# Patient Record
Sex: MALE | Race: WHITE | Employment: FULL TIME | URBAN - METROPOLITAN AREA
[De-identification: names, ages, dates, MRNs, and addresses within clinical notes are randomized per-mention and may not be internally consistent; named-entity substitution may affect disease eponyms.]

---

## 2018-09-06 ENCOUNTER — HOSPITAL ENCOUNTER (INPATIENT)
Age: 48
LOS: 2 days | Discharge: HOME | DRG: 754 | End: 2018-09-09
Attending: EMERGENCY MEDICINE | Admitting: PSYCHIATRY & NEUROLOGY
Payer: MEDICAID

## 2018-09-06 DIAGNOSIS — R45.89 SUICIDAL BEHAVIOR WITHOUT ATTEMPTED SELF-INJURY: Primary | ICD-10-CM

## 2018-09-06 DIAGNOSIS — F10.920 ACUTE ALCOHOLIC INTOXICATION WITHOUT COMPLICATION (HCC): ICD-10-CM

## 2018-09-06 DIAGNOSIS — R45.851 SUICIDAL THOUGHTS: ICD-10-CM

## 2018-09-06 LAB
A/G RATIO: 1.8 (ref 1.1–2.2)
ACETAMINOPHEN LEVEL: <5 UG/ML (ref 10–30)
ALBUMIN SERPL-MCNC: 4.7 G/DL (ref 3.4–5)
ALP BLD-CCNC: 67 U/L (ref 40–129)
ALT SERPL-CCNC: 21 U/L (ref 10–40)
AMPHETAMINE SCREEN, URINE: POSITIVE
ANION GAP SERPL CALCULATED.3IONS-SCNC: 14 MMOL/L (ref 3–16)
AST SERPL-CCNC: 15 U/L (ref 15–37)
BARBITURATE SCREEN URINE: ABNORMAL
BASOPHILS ABSOLUTE: 0.1 K/UL (ref 0–0.2)
BASOPHILS RELATIVE PERCENT: 1 %
BENZODIAZEPINE SCREEN, URINE: ABNORMAL
BILIRUB SERPL-MCNC: 0.3 MG/DL (ref 0–1)
BILIRUBIN URINE: NEGATIVE
BLOOD, URINE: ABNORMAL
BUN BLDV-MCNC: 8 MG/DL (ref 7–20)
CALCIUM SERPL-MCNC: 9.2 MG/DL (ref 8.3–10.6)
CANNABINOID SCREEN URINE: ABNORMAL
CHLORIDE BLD-SCNC: 97 MMOL/L (ref 99–110)
CLARITY: CLEAR
CO2: 23 MMOL/L (ref 21–32)
COCAINE METABOLITE SCREEN URINE: ABNORMAL
COLOR: ABNORMAL
CREAT SERPL-MCNC: 0.7 MG/DL (ref 0.9–1.3)
EOSINOPHILS ABSOLUTE: 0.2 K/UL (ref 0–0.6)
EOSINOPHILS RELATIVE PERCENT: 3.3 %
ETHANOL: 186 MG/DL (ref 0–0.08)
GFR AFRICAN AMERICAN: >60
GFR NON-AFRICAN AMERICAN: >60
GLOBULIN: 2.6 G/DL
GLUCOSE BLD-MCNC: 95 MG/DL (ref 70–99)
GLUCOSE URINE: NEGATIVE MG/DL
HCT VFR BLD CALC: 43.6 % (ref 40.5–52.5)
HEMOGLOBIN: 15.4 G/DL (ref 13.5–17.5)
KETONES, URINE: NEGATIVE MG/DL
LEUKOCYTE ESTERASE, URINE: NEGATIVE
LYMPHOCYTES ABSOLUTE: 2.7 K/UL (ref 1–5.1)
LYMPHOCYTES RELATIVE PERCENT: 42.4 %
Lab: ABNORMAL
MCH RBC QN AUTO: 33.7 PG (ref 26–34)
MCHC RBC AUTO-ENTMCNC: 35.3 G/DL (ref 31–36)
MCV RBC AUTO: 95.6 FL (ref 80–100)
METHADONE SCREEN, URINE: ABNORMAL
MICROSCOPIC EXAMINATION: YES
MONOCYTES ABSOLUTE: 0.4 K/UL (ref 0–1.3)
MONOCYTES RELATIVE PERCENT: 6.9 %
NEUTROPHILS ABSOLUTE: 3 K/UL (ref 1.7–7.7)
NEUTROPHILS RELATIVE PERCENT: 46.4 %
NITRITE, URINE: NEGATIVE
OPIATE SCREEN URINE: ABNORMAL
OXYCODONE URINE: ABNORMAL
PDW BLD-RTO: 13 % (ref 12.4–15.4)
PH UA: 6
PH UA: 6
PHENCYCLIDINE SCREEN URINE: ABNORMAL
PLATELET # BLD: 264 K/UL (ref 135–450)
PMV BLD AUTO: 7.4 FL (ref 5–10.5)
POTASSIUM REFLEX MAGNESIUM: 4 MMOL/L (ref 3.5–5.1)
PROPOXYPHENE SCREEN: ABNORMAL
PROTEIN UA: NEGATIVE MG/DL
RBC # BLD: 4.56 M/UL (ref 4.2–5.9)
RBC UA: NORMAL /HPF (ref 0–2)
SALICYLATE, SERUM: <0.3 MG/DL (ref 15–30)
SODIUM BLD-SCNC: 134 MMOL/L (ref 136–145)
SPECIFIC GRAVITY UA: <=1.005
TOTAL PROTEIN: 7.3 G/DL (ref 6.4–8.2)
URINE TYPE: ABNORMAL
UROBILINOGEN, URINE: 0.2 E.U./DL
WBC # BLD: 6.4 K/UL (ref 4–11)
WBC UA: NORMAL /HPF (ref 0–5)

## 2018-09-06 PROCEDURE — 81001 URINALYSIS AUTO W/SCOPE: CPT

## 2018-09-06 PROCEDURE — 85025 COMPLETE CBC W/AUTO DIFF WBC: CPT

## 2018-09-06 PROCEDURE — G0480 DRUG TEST DEF 1-7 CLASSES: HCPCS

## 2018-09-06 PROCEDURE — 99285 EMERGENCY DEPT VISIT HI MDM: CPT

## 2018-09-06 PROCEDURE — 80053 COMPREHEN METABOLIC PANEL: CPT

## 2018-09-06 PROCEDURE — 80307 DRUG TEST PRSMV CHEM ANLYZR: CPT

## 2018-09-06 RX ORDER — NICOTINE 21 MG/24HR
1 PATCH, TRANSDERMAL 24 HOURS TRANSDERMAL DAILY
Status: DISCONTINUED | OUTPATIENT
Start: 2018-09-06 | End: 2018-09-09 | Stop reason: HOSPADM

## 2018-09-07 PROBLEM — F39 MOOD DISORDER (HCC): Status: ACTIVE | Noted: 2018-09-07

## 2018-09-07 PROBLEM — F10.920 ALCOHOLIC INTOXICATION WITHOUT COMPLICATION (HCC): Status: ACTIVE | Noted: 2018-09-07

## 2018-09-07 PROBLEM — F32.A DEPRESSIVE DISORDER: Status: ACTIVE | Noted: 2018-09-07

## 2018-09-07 PROBLEM — R82.5 POSITIVE URINE DRUG SCREEN: Status: ACTIVE | Noted: 2018-09-07

## 2018-09-07 PROBLEM — F10.10 ALCOHOL ABUSE: Chronic | Status: ACTIVE | Noted: 2018-09-07

## 2018-09-07 LAB — ETHANOL: 63 MG/DL (ref 0–0.08)

## 2018-09-07 PROCEDURE — 99221 1ST HOSP IP/OBS SF/LOW 40: CPT | Performed by: PHYSICIAN ASSISTANT

## 2018-09-07 PROCEDURE — 1240000000 HC EMOTIONAL WELLNESS R&B

## 2018-09-07 PROCEDURE — G0480 DRUG TEST DEF 1-7 CLASSES: HCPCS

## 2018-09-07 PROCEDURE — 99223 1ST HOSP IP/OBS HIGH 75: CPT | Performed by: PSYCHIATRY & NEUROLOGY

## 2018-09-07 RX ORDER — OLANZAPINE 10 MG/1
10 INJECTION, POWDER, LYOPHILIZED, FOR SOLUTION INTRAMUSCULAR
Status: ACTIVE | OUTPATIENT
Start: 2018-09-07 | End: 2018-09-07

## 2018-09-07 RX ORDER — ONDANSETRON 2 MG/ML
4 INJECTION INTRAMUSCULAR; INTRAVENOUS EVERY 6 HOURS PRN
Status: DISCONTINUED | OUTPATIENT
Start: 2018-09-07 | End: 2018-09-09 | Stop reason: HOSPADM

## 2018-09-07 RX ORDER — NICOTINE 21 MG/24HR
1 PATCH, TRANSDERMAL 24 HOURS TRANSDERMAL DAILY PRN
Status: DISCONTINUED | OUTPATIENT
Start: 2018-09-07 | End: 2018-09-09 | Stop reason: HOSPADM

## 2018-09-07 RX ORDER — HYDROXYZINE PAMOATE 50 MG/1
50 CAPSULE ORAL 3 TIMES DAILY PRN
Status: DISCONTINUED | OUTPATIENT
Start: 2018-09-07 | End: 2018-09-09 | Stop reason: HOSPADM

## 2018-09-07 RX ORDER — OLANZAPINE 5 MG/1
5 TABLET ORAL 2 TIMES DAILY PRN
Status: DISCONTINUED | OUTPATIENT
Start: 2018-09-07 | End: 2018-09-09 | Stop reason: HOSPADM

## 2018-09-07 RX ORDER — ACETAMINOPHEN 325 MG/1
650 TABLET ORAL EVERY 4 HOURS PRN
Status: DISCONTINUED | OUTPATIENT
Start: 2018-09-07 | End: 2018-09-09 | Stop reason: HOSPADM

## 2018-09-07 RX ORDER — MAGNESIUM HYDROXIDE/ALUMINUM HYDROXICE/SIMETHICONE 120; 1200; 1200 MG/30ML; MG/30ML; MG/30ML
30 SUSPENSION ORAL EVERY 6 HOURS PRN
Status: DISCONTINUED | OUTPATIENT
Start: 2018-09-07 | End: 2018-09-09 | Stop reason: HOSPADM

## 2018-09-07 ASSESSMENT — SLEEP AND FATIGUE QUESTIONNAIRES
DIFFICULTY STAYING ASLEEP: NO
SLEEP PATTERN: DIFFICULTY FALLING ASLEEP
RESTFUL SLEEP: NO
DIFFICULTY ARISING: NO
DIFFICULTY FALLING ASLEEP: YES
DO YOU HAVE DIFFICULTY SLEEPING: YES

## 2018-09-07 ASSESSMENT — LIFESTYLE VARIABLES: HISTORY_ALCOHOL_USE: NO

## 2018-09-07 ASSESSMENT — PAIN SCALES - GENERAL: PAINLEVEL_OUTOF10: 10

## 2018-09-07 ASSESSMENT — PAIN DESCRIPTION - FREQUENCY: FREQUENCY: CONTINUOUS

## 2018-09-07 ASSESSMENT — PAIN DESCRIPTION - LOCATION: LOCATION: BACK;NECK

## 2018-09-07 ASSESSMENT — PAIN DESCRIPTION - PAIN TYPE: TYPE: CHRONIC PAIN

## 2018-09-07 ASSESSMENT — PAIN DESCRIPTION - PROGRESSION: CLINICAL_PROGRESSION: NOT CHANGED

## 2018-09-07 NOTE — ED NOTES
Pt continues to be awake and JIM lobby. Declines to go into assigned treatment room to rest. Pt remains social with staff and peers. Pt offered support to a peer when she became tearful.

## 2018-09-07 NOTE — ED NOTES
Pt sitting in [facility] OF [facility] chairs laughing and joking with staff. Pt pleasant, calm and cooperative at this time.       [address] [place] [address]  09/06/18 2020

## 2018-09-07 NOTE — ED PROVIDER NOTES
Emergency Physician Note    Chief Complaint  Suicidal (ran out in the woods drank a bottle of what he thought was antifreeze and ate peaches because he thought he was allergic.  pt continually threatening to kill himself.  stating if anyone tries to draw his blood he will kill himself infront of me or make us kill him.  states as soon as he gets the chance he is going to kill himself)       History of Present Illness  Adis Vines is a 53 y.o. male who presents to the ED for suicidal risk. Patient was brought in by the police after making active suicidal statements to his family members. Patient states at this time he has every intention to take kill himself, he states that he wants to just \"leave life,\" he states that he promises to be dead by tomorrow at noon. He does report drink alcohol today but denies any illicit drug use. Denies fever, chills, malaise, chest pain, shortness of breath, cough, abdominal pain, nausea, vomiting, diarrhea, headache, sore throat, dysuria, back pain, rash. No palliative/provocative factors. Positives and pertinent negatives as per HPI. All other of the 10 systems were reviewed and are negative. I have reviewed the following from the nursing documentation:      Prior to Admission medications    Not on File       Allergies as of 09/06/2018    (No Known Allergies)       Past Medical History:   Diagnosis Date    Cancer [facility])     testicular        Surgical History:   Past Surgical History:   Procedure Laterality Date    APPENDECTOMY          Family History:  History reviewed. No pertinent family history. Social History     Social History    Marital status: Single     Spouse name: N/A    Number of children: N/A    Years of education: N/A     Occupational History    Not on file.      Social History Main Topics    Smoking status: Current Every Day Smoker     Packs/day: 1.00    Smokeless tobacco: Not on file    Alcohol use Yes    Drug use: Unknown    thoughts    3. Acute alcoholic intoxication without complication (HCC)        Blood pressure 139/81, pulse 100, temperature 98.6 °F (37 °C), temperature source Oral, resp. rate 18, weight 185 lb (83.9 kg), SpO2 97 %. I have signed out [facility] Emergency Department care to my colleague, Dr. Truman Spencer. We discussed the pertinent history, physical exam, completed/pending test results (if applicable) and current treatment plan. Please refer to his/her chart for the patients remaining Emergency Department course and final disposition. The note was completed using Dragon voice recognition transcription. Every effort was made to ensure accuracy; however, inadvertent transcription errors may be present despite my best efforts to edit errors.     Damon Schneider MD  [facility]        Damon Schneider MD  09/06/18 [id number]

## 2018-09-07 NOTE — ED PROVIDER NOTES
suicidal ideation. Patient is currently awaiting reevaluation when sober for further evaluation and disposition. Patient's repeat blood alcohol is 63. This point the patient is clear for psychiatric evaluation. Patient was admitted to the hospital for further psychiatric evaluation and treatment    ED Medication Orders     Start Ordered     Status Ordering Provider    09/06/18 1930 09/06/18 1924  nicotine (NICODERM CQ) 21 MG/24HR 1 patch  DAILY      Acknowledged oMises Levine          Final Impression      1. Suicidal behavior without attempted self-injury    2. Suicidal thoughts    3.  Acute alcoholic intoxication without complication ([facility] [address]        DISPOSITION Ed Observation 09/06/2018 08:55:08 PM     (Please note that portions of this note may have been completed with a voice recognition program. Efforts were made to edit the dictations but occasionally words are mis-transcribed.)    Anamaria Haile MD  [address] [facility]         Anamaria Haile MD  09/07/18 Lester Leggett MD  09/07/18 [id number]

## 2018-09-07 NOTE — H&P
for dyspnea, cough   Cardiovascular: Negative for chest pain   Gastrointestinal: Negative for vomiting, diarrhea   Genitourinary: Negative for hematuria   Musculoskeletal: Negative for arthralgias   Skin: Negative for rash   Neurological: Negative for syncope    Hematological: Negative for easy bruising/bleeding   Psychiatric/Behavorial: Per psychiatry team evaluation     PHYSICAL EXAM:    BP (!) 151/102 Comment: RN notified  Pulse 67   Temp 98.9 °F (37.2 °C) (Oral)   Resp 16   Ht 6' (1.829 m)   Wt 185 lb (83.9 kg)   SpO2 97%   BMI 25.09 kg/m²     Gen: No distress. Alert. Eyes: PERRL. No sclera icterus. No conjunctival injection. ENT: No discharge. Pharynx clear. Neck: No JVD. No Carotid Bruit. Trachea midline. Resp: No accessory muscle use. No crackles. No wheezes. No rhonchi. CV: Regular rate. Regular rhythm. No murmur. No rub. No edema. GI: Non-tender. Non-distended. Normal bowel sounds. Skin: Warm and dry. No nodule on exposed extremities. No rash on exposed extremities. M/S: No cyanosis. No joint deformity. No clubbing. Neuro: Awake. No focal neurologic deficit on exam.  Cranial nerves II through XII intact. Patient is able to ambulate without difficulty. Psych: Per psychiatry team evaluation     CBC:   Recent Labs      09/06/18 1925   WBC  6.4   HGB  15.4   HCT  43.6   MCV  95.6   PLT  264     BMP:   Recent Labs      09/06/18 1925   NA  134*   K  4.0   CL  97*   CO2  23   BUN  8   CREATININE  0.7*     LIVER PROFILE:   Recent Labs      09/06/18 1925   AST  15   ALT  21   BILITOT  0.3   ALKPHOS  67     PT/INR: No results for input(s): PROTIME, INR in the last 72 hours. APTT: No results for input(s): APTT in the last 72 hours.   UA:  Recent Labs      09/06/18 1915   COLORU  Straw   PHUR  6.0  6.0   WBCUA  0-2   RBCUA  0-2   CLARITYU  Clear   SPECGRAV  <=1.005   LEUKOCYTESUR  Negative   UROBILINOGEN  0.2   BILIRUBINUR  Negative   BLOODU  TRACE-INTACT*   GLUCOSEU  Negative

## 2018-09-07 NOTE — PLAN OF CARE
Problem: Depressive Behavior With or Without Suicide Precautions:  Goal: Able to verbalize acceptance of life and situations over which he or she has no control  Able to verbalize acceptance of life and situations over which he or she has no control   Outcome: Ongoing                                                                      Group Therapy Note    Date: 9/7/2018  Start Time: 1:00  End Time:  2:30  Number of Participants: 7    Type of Group: Recovery   \"who am I\"    Patient's Goal:  To reduce anxiety and depressive symptoms by participating in group discussion    Notes:  Patient participated minimally in group discussion. Patient reports he is angry about being in the hospital.  Patient reports he does not want to talk and will not be participating in any after care. Patient was supportive to peers. Status After Intervention:  Unchanged    Participation Level:  Active Listener and Minimal    Participation Quality: Attentive      Speech:  normal      Thought Process/Content: Logical      Affective Functioning: Congruent      Mood: angry and anxious      Level of consciousness:  Alert      Response to Learning: Able to verbalize current knowledge/experience, Able to verbalize/acknowledge new learning, Able to retain information and Capable of insight      Endings: None Reported    Modes of Intervention: Education, Support, Socialization and Exploration      Discipline Responsible: /Counselor      Signature:  SANDRA Bautista

## 2018-09-08 PROCEDURE — 1240000000 HC EMOTIONAL WELLNESS R&B

## 2018-09-08 PROCEDURE — 99233 SBSQ HOSP IP/OBS HIGH 50: CPT | Performed by: PSYCHIATRY & NEUROLOGY

## 2018-09-08 PROCEDURE — 6370000000 HC RX 637 (ALT 250 FOR IP): Performed by: NURSE PRACTITIONER

## 2018-09-08 RX ORDER — IBUPROFEN 600 MG/1
600 TABLET ORAL EVERY 6 HOURS PRN
Status: DISCONTINUED | OUTPATIENT
Start: 2018-09-08 | End: 2018-09-09 | Stop reason: HOSPADM

## 2018-09-08 RX ADMIN — ACETAMINOPHEN 650 MG: 325 TABLET ORAL at 11:50

## 2018-09-08 ASSESSMENT — PAIN SCALES - GENERAL: PAINLEVEL_OUTOF10: 3

## 2018-09-08 NOTE — BH NOTE
Group Therapy Note    Date: 9/7/2018  Start Time: 2030  End Time:  2100  Number of Participants: 10    Type of Group: Wrap-Up       Status After Intervention:  Improved    Participation Level:  Active Listener and Interactive    Participation Quality: Appropriate, Sharing and Supportive      Speech:  normal      Thought Process/Content: Logical  Linear      Affective Functioning: Appropriate       Mood: depressed      Level of consciousness:  Alert and Oriented x4      Response to Learning: Capable of insight and Progressing to goal      Modes of Intervention: Support, Socialization, Exploration and Clarifying      Discipline Responsible: [employer]      Signature:  Cari Henderson
Patient attended 1:00 EMILIANO Group.
for counseling faxed to [facility]                                           ( ) Patient refused counseling  ( ) Patient has not smoked in the last 30 days    Metabolic Screening:    No results found for: LABA1C    No results for input(s): CHOL, TRIG, HDL, LDLCALC, LABVLDL in the last 72 hours. Body mass index is 25.09 kg/m². BP Readings from Last 2 Encounters:   09/07/18 (!) 151/102   02/08/12 (!) 138/92           Pt admitted with followings belongings:  Dentures: None  Vision - Corrective Lenses: None  Hearing Aid: None  Jewelry: None  Body Piercings Removed: N/A  Clothing: Footwear, Pants, Shirt, Socks, Undergarments (Comment)  Were All Patient Medications Collected?: Not Applicable  Other Valuables: Other (Comment) (cigarette papers)     Valuables placed in locker clothes to patient. Patient's home medications were none. Patient oriented to surroundings and program expectations and copy of patient rights given. Received admission packet:  yes. Consents reviewed, signed none. Refused all paperwork because he feels he should not be here. Patient verbalize understanding:  yes.     Patient education on precautions: yes                   Rishi Marquez RN
minimum assist (75% patients effort)
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
## 2018-09-08 NOTE — PROGRESS NOTES
Department of Psychiatry  Attending Progress Note  Chief Complaint: follow-up Michelle Torres is brighter but is spending time in bed and not attending all program.He doesn't seem to  think that his alcohol an danger are a problem but family stated that it is. Feeling tired and worried about the kids in the home. Stated that he only tasted the antifreeze and didn't drink much of it. Patient's chart was reviewed and collaborated with  about the treatment plan. SUBJECTIVE:    Patient is feeling better. Suicidal ideation:  denies suicidal ideation. Patient does not have medication side effects. ROS: Patient has new complaints: no  Sleeping adequately:  Yes   Appetite adequate: Yes  Attending groups: Yes  Visitors:No    OBJECTIVE    Physical  VITALS:  /66   Pulse 51   Temp 98 °F (36.7 °C) (Oral)   Resp 16   Ht 6' (1.829 m)   Wt 185 lb (83.9 kg)   SpO2 97%   BMI 25.09 kg/m²     Mental Status Examination:  Patients appearance was street clothes. Thoughts are Goal directed. Homicidal ideations none. No abnormal movements, tics or mannerisms. Memory intact Aims 0. Concentration Good. Alert and oriented X 4. Insight and Judgement impaired insight. Patient was cooperative. Patient gait normal. Mood within normal limits, affect normal affect Hallucinations Absent, suicidal ideations no specific plan to harm self Speech normal volume  Data  Labs:   Admission on 09/06/2018   Component Date Value Ref Range Status    Ethanol Lvl 09/06/2018 186  mg/dL Final    Comment:    None Detected  Conversion factor:  100 mg/dl = .100 g/dl  For Medical Purposes Only      Amphetamine Screen, Urine 09/06/2018 POSITIVE* Negative <1000ng/mL Final    Comment: High concentrations of ephedrine/pseudoephedrine or  phenylpropanolamine may cause false positive results  for amphetamine. Therefore, confirmatory testing for  amphetamine should be considered if clinically indicated.       Barbiturate Screen, Ur 09/06/2018 Neg K/uL Final    MPV 09/06/2018 7.4  5.0 - 10.5 fL Final    Neutrophils % 09/06/2018 46.4  % Final    Lymphocytes % 09/06/2018 42.4  % Final    Monocytes % 09/06/2018 6.9  % Final    Eosinophils % 09/06/2018 3.3  % Final    Basophils % 09/06/2018 1.0  % Final    Neutrophils # 09/06/2018 3.0  1.7 - 7.7 K/uL Final    Lymphocytes # 09/06/2018 2.7  1.0 - 5.1 K/uL Final    Monocytes # 09/06/2018 0.4  0.0 - 1.3 K/uL Final    Eosinophils # 09/06/2018 0.2  0.0 - 0.6 K/uL Final    Basophils # 09/06/2018 0.1  0.0 - 0.2 K/uL Final    Sodium 09/06/2018 134* 136 - 145 mmol/L Final    Potassium reflex Magnesium 09/06/2018 4.0  3.5 - 5.1 mmol/L Final    Chloride 09/06/2018 97* 99 - 110 mmol/L Final    CO2 09/06/2018 23  21 - 32 mmol/L Final    Anion Gap 09/06/2018 14  3 - 16 Final    Glucose 09/06/2018 95  70 - 99 mg/dL Final    BUN 09/06/2018 8  7 - 20 mg/dL Final    CREATININE 09/06/2018 0.7* 0.9 - 1.3 mg/dL Final    GFR Non- 09/06/2018 >60  >60 Final    Comment: >60 mL/min/1.73m2 EGFR, calc. for ages 25 and older using the  MDRD formula (not corrected for weight), is valid for stable  renal function.  GFR  09/06/2018 >60  >60 Final    Comment: Chronic Kidney Disease: less than 60 ml/min/1.73 sq.m. Kidney Failure: less than 15 ml/min/1.73 sq.m. Results valid for patients 18 years and older.       Calcium 09/06/2018 9.2  8.3 - 10.6 mg/dL Final    Total Protein 09/06/2018 7.3  6.4 - 8.2 g/dL Final    Alb 09/06/2018 4.7  3.4 - 5.0 g/dL Final    Albumin/Globulin Ratio 09/06/2018 1.8  1.1 - 2.2 Final    Total Bilirubin 09/06/2018 0.3  0.0 - 1.0 mg/dL Final    Alkaline Phosphatase 09/06/2018 67  40 - 129 U/L Final    ALT 09/06/2018 21  10 - 40 U/L Final    AST 09/06/2018 15  15 - 37 U/L Final    Globulin 09/06/2018 2.6  g/dL Final    Color, UA 09/06/2018 Straw  Straw/Yellow Final    Clarity, UA 09/06/2018 Clear  Clear Final    Glucose, Ur 09/06/2018 Negative Negative mg/dL Final    Bilirubin Urine 09/06/2018 Negative  Negative Final    Ketones, Urine 09/06/2018 Negative  Negative mg/dL Final    Specific Gravity, UA 09/06/2018 <=1.005  1.005 - 1.030 Final    Blood, Urine 09/06/2018 TRACE-INTACT* Negative Final    pH, UA 09/06/2018 6.0  5.0 - 8.0 Final    Protein, UA 09/06/2018 Negative  Negative mg/dL Final    Urobilinogen, Urine 09/06/2018 0.2  <2.0 E.U./dL Final    Nitrite, Urine 09/06/2018 Negative  Negative Final    Leukocyte Esterase, Urine 09/06/2018 Negative  Negative Final    Microscopic Examination 09/06/2018 YES   Final    Urine Type 09/06/2018 Not Specified   Final    WBC, UA 09/06/2018 0-2  0 - 5 /HPF Final    RBC, UA 09/06/2018 0-2  0 - 2 /HPF Final    Ethanol Lvl 09/07/2018 63  mg/dL Final    Comment:    None Detected  Conversion factor:  100 mg/dl = .100 g/dl  For Medical Purposes Only              Medications  Current Facility-Administered Medications: ibuprofen (ADVIL;MOTRIN) tablet 600 mg, 600 mg, Oral, Q6H PRN  acetaminophen (TYLENOL) tablet 650 mg, 650 mg, Oral, Q4H PRN  hydrOXYzine (VISTARIL) capsule 50 mg, 50 mg, Oral, TID PRN  OLANZapine (ZYPREXA) tablet 5 mg, 5 mg, Oral, BID PRN  magnesium hydroxide (MILK OF MAGNESIA) 400 MG/5ML suspension 30 mL, 30 mL, Oral, Daily PRN  aluminum & magnesium hydroxide-simethicone (MAALOX) 200-200-20 MG/5ML suspension 30 mL, 30 mL, Oral, Q6H PRN  nicotine (NICODERM CQ) 21 MG/24HR 1 patch, 1 patch, Transdermal, Daily PRN  nicotine polacrilex (NICORETTE) gum 2 mg, 2 mg, Oral, Q2H PRN  ondansetron (ZOFRAN) injection 4 mg, 4 mg, Intravenous, Q6H PRN  nicotine (NICODERM CQ) 21 MG/24HR 1 patch, 1 patch, Transdermal, Daily    ASSESSMENT AND PLAN    Principal Problem:    Depressive disorder  Active Problems:    Alcohol abuse    Alcoholic intoxication without complication ([facility] [address]    Suicidal behavior without attempted self-injury    Positive urine drug screen  Resolved Problems:    * No resolved hospital

## 2018-09-08 NOTE — PLAN OF CARE
Problem: Depressive Behavior With or Without Suicide Precautions:  Goal: Ability to disclose and discuss suicidal ideas will improve  Ability to disclose and discuss suicidal ideas will improve   Outcome: Ongoing  Pt denies SI/HI at this time.

## 2018-09-09 VITALS
HEART RATE: 56 BPM | RESPIRATION RATE: 16 BRPM | WEIGHT: 185 LBS | OXYGEN SATURATION: 97 % | BODY MASS INDEX: 25.06 KG/M2 | HEIGHT: 72 IN | DIASTOLIC BLOOD PRESSURE: 76 MMHG | SYSTOLIC BLOOD PRESSURE: 109 MMHG | TEMPERATURE: 98.2 F

## 2018-09-09 PROBLEM — F33.9 MAJOR DEPRESSION, RECURRENT (HCC): Status: ACTIVE | Noted: 2018-09-09

## 2018-09-09 PROCEDURE — 5130000000 HC BRIDGE APPOINTMENT

## 2018-09-09 PROCEDURE — 99239 HOSP IP/OBS DSCHRG MGMT >30: CPT | Performed by: PSYCHIATRY & NEUROLOGY

## 2018-09-09 NOTE — PLAN OF CARE
Problem: Depressive Behavior With or Without Suicide Precautions:  Goal: Ability to disclose and discuss suicidal ideas will improve  Ability to disclose and discuss suicidal ideas will improve   Outcome: Ongoing  Pt denies SI at this time.

## 2018-09-09 NOTE — PLAN OF CARE
Problem: Depressive Behavior With or Without Suicide Precautions:  Goal: Able to verbalize acceptance of life and situations over which he or she has no control  Able to verbalize acceptance of life and situations over which he or she has no control   Outcome: Ongoing                                                                      Group Therapy Note    Date: 9/9/2018  Start Time: 10:45 am  End Time:  11:30 am  Number of Participants: 5    Type of Group: Psychoeducation    Wellness Binder Information  Module Name:  Radical Acceptance    Patient's Goal:  Patient will learn skills to be more accepting of negative situations that he has no control over and how to use distress tolerance skills    Notes:  Patient learned about radical acceptance and how to accept things that he cannot change. Patient participated in the discussion by sharing his situation and working through the skills provided in the worksheet by identifying his role in his situation, his reaction, and how he can handle a similar situation in the future.      Status After Intervention:  Improved    Participation Level: Interactive    Participation Quality: Sharing      Speech:  loud      Thought Process/Content: Linear      Affective Functioning: Congruent      Mood: anxious      Level of consciousness:  Alert and Oriented x4      Response to Learning: Able to verbalize current knowledge/experience and Progressing to goal      Endings: None Reported    Modes of Intervention: Education      Discipline Responsible: /Counselor      Signature:  KYRA Perla

## 2018-10-03 NOTE — DISCHARGE SUMMARY
He  did not seem to think that his alcohol was a problem and talked about being  worried about the kids in the home. He later stated that he only tasted  the antifeeze and did not drink much of it. He was eventually discharged  home with Outpatient Services, although I anticipate he will not follow  through with any type of followup.         Fredi Mckeon MD    D: 10/02/2018 13:40:14       T: 10/03/2018 0:13:05     [email or web address]  Job#: [id number]     Doc#: [id number]    CC:

## 2019-02-20 ENCOUNTER — APPOINTMENT (OUTPATIENT)
Dept: GENERAL RADIOLOGY | Age: 49
End: 2019-02-20
Payer: MEDICAID

## 2019-02-20 ENCOUNTER — HOSPITAL ENCOUNTER (EMERGENCY)
Age: 49
Discharge: HOME | End: 2019-02-20
Payer: MEDICAID

## 2019-02-20 VITALS
RESPIRATION RATE: 18 BRPM | TEMPERATURE: 97.7 F | BODY MASS INDEX: 25.06 KG/M2 | DIASTOLIC BLOOD PRESSURE: 73 MMHG | SYSTOLIC BLOOD PRESSURE: 123 MMHG | WEIGHT: 185 LBS | OXYGEN SATURATION: 98 % | HEIGHT: 72 IN | HEART RATE: 61 BPM

## 2019-02-20 DIAGNOSIS — S39.012A STRAIN OF LUMBAR REGION, INITIAL ENCOUNTER: Primary | ICD-10-CM

## 2019-02-20 DIAGNOSIS — F10.10 ALCOHOL ABUSE: ICD-10-CM

## 2019-02-20 PROCEDURE — 97140 MANUAL THERAPY 1/> REGIONS: CPT

## 2019-02-20 PROCEDURE — 72100 X-RAY EXAM L-S SPINE 2/3 VWS: CPT

## 2019-02-20 PROCEDURE — 6370000000 HC RX 637 (ALT 250 FOR IP): Performed by: NURSE PRACTITIONER

## 2019-02-20 PROCEDURE — 97162 PT EVAL MOD COMPLEX 30 MIN: CPT

## 2019-02-20 PROCEDURE — 97110 THERAPEUTIC EXERCISES: CPT

## 2019-02-20 PROCEDURE — 99283 EMERGENCY DEPT VISIT LOW MDM: CPT

## 2019-02-20 RX ORDER — PREDNISONE 20 MG/1
60 TABLET ORAL ONCE
Status: COMPLETED | OUTPATIENT
Start: 2019-02-20 | End: 2019-02-20

## 2019-02-20 RX ORDER — METHOCARBAMOL 500 MG/1
500 TABLET, FILM COATED ORAL 3 TIMES DAILY
Qty: 15 TABLET | Refills: 0 | Status: SHIPPED | OUTPATIENT
Start: 2019-02-20 | End: 2019-02-25

## 2019-02-20 RX ORDER — NAPROXEN 250 MG/1
250 TABLET ORAL ONCE
Status: COMPLETED | OUTPATIENT
Start: 2019-02-20 | End: 2019-02-20

## 2019-02-20 RX ORDER — METHOCARBAMOL 750 MG/1
750 TABLET, FILM COATED ORAL ONCE
Status: COMPLETED | OUTPATIENT
Start: 2019-02-20 | End: 2019-02-20

## 2019-02-20 RX ORDER — PREDNISONE 20 MG/1
TABLET ORAL
Qty: 18 TABLET | Refills: 0 | Status: SHIPPED | OUTPATIENT
Start: 2019-02-20

## 2019-02-20 RX ADMIN — METHOCARBAMOL 750 MG: 750 TABLET ORAL at 14:31

## 2019-02-20 RX ADMIN — PREDNISONE 60 MG: 20 TABLET ORAL at 14:31

## 2019-02-20 RX ADMIN — NAPROXEN 250 MG: 250 TABLET ORAL at 14:31

## 2019-02-20 ASSESSMENT — ENCOUNTER SYMPTOMS
SHORTNESS OF BREATH: 0
ABDOMINAL PAIN: 0
BACK PAIN: 1

## 2019-02-20 ASSESSMENT — PAIN SCALES - GENERAL: PAINLEVEL_OUTOF10: 10

## 2019-05-21 ENCOUNTER — APPOINTMENT (OUTPATIENT)
Dept: GENERAL RADIOLOGY | Age: 49
End: 2019-05-21
Payer: MEDICAID

## 2019-05-21 ENCOUNTER — HOSPITAL ENCOUNTER (EMERGENCY)
Age: 49
Discharge: HOME | End: 2019-05-21
Attending: EMERGENCY MEDICINE
Payer: MEDICAID

## 2019-05-21 VITALS
RESPIRATION RATE: 16 BRPM | TEMPERATURE: 98.9 F | HEART RATE: 58 BPM | BODY MASS INDEX: 25.06 KG/M2 | WEIGHT: 185 LBS | SYSTOLIC BLOOD PRESSURE: 153 MMHG | DIASTOLIC BLOOD PRESSURE: 93 MMHG | HEIGHT: 72 IN | OXYGEN SATURATION: 100 %

## 2019-05-21 DIAGNOSIS — M25.561 ACUTE PAIN OF RIGHT KNEE: Primary | ICD-10-CM

## 2019-05-21 DIAGNOSIS — S01.91XA: ICD-10-CM

## 2019-05-21 PROCEDURE — 99283 EMERGENCY DEPT VISIT LOW MDM: CPT

## 2019-05-21 PROCEDURE — 90471 IMMUNIZATION ADMIN: CPT | Performed by: NURSE PRACTITIONER

## 2019-05-21 PROCEDURE — 73562 X-RAY EXAM OF KNEE 3: CPT

## 2019-05-21 PROCEDURE — 6370000000 HC RX 637 (ALT 250 FOR IP): Performed by: NURSE PRACTITIONER

## 2019-05-21 PROCEDURE — 6360000002 HC RX W HCPCS: Performed by: NURSE PRACTITIONER

## 2019-05-21 PROCEDURE — 90715 TDAP VACCINE 7 YRS/> IM: CPT | Performed by: NURSE PRACTITIONER

## 2019-05-21 RX ORDER — HYDROCODONE BITARTRATE AND ACETAMINOPHEN 5; 325 MG/1; MG/1
1 TABLET ORAL EVERY 6 HOURS PRN
Qty: 6 TABLET | Refills: 0 | Status: SHIPPED | OUTPATIENT
Start: 2019-05-21 | End: 2019-05-24

## 2019-05-21 RX ORDER — NAPROXEN 500 MG/1
500 TABLET ORAL 2 TIMES DAILY
Qty: 20 TABLET | Refills: 0 | Status: SHIPPED | OUTPATIENT
Start: 2019-05-21 | End: 2019-05-31

## 2019-05-21 RX ORDER — NAPROXEN 250 MG/1
500 TABLET ORAL ONCE
Status: COMPLETED | OUTPATIENT
Start: 2019-05-21 | End: 2019-05-21

## 2019-05-21 RX ORDER — HYDROCODONE BITARTRATE AND ACETAMINOPHEN 7.5; 325 MG/1; MG/1
1 TABLET ORAL ONCE
Status: COMPLETED | OUTPATIENT
Start: 2019-05-21 | End: 2019-05-21

## 2019-05-21 RX ADMIN — TETANUS TOXOID, REDUCED DIPHTHERIA TOXOID AND ACELLULAR PERTUSSIS VACCINE, ADSORBED 0.5 ML: 5; 2.5; 8; 8; 2.5 SUSPENSION INTRAMUSCULAR at 13:51

## 2019-05-21 RX ADMIN — HYDROCODONE BITARTRATE AND ACETAMINOPHEN 1 TABLET: 7.5; 325 TABLET ORAL at 13:51

## 2019-05-21 RX ADMIN — NAPROXEN 500 MG: 250 TABLET ORAL at 13:39

## 2019-05-21 ASSESSMENT — PAIN SCALES - GENERAL
PAINLEVEL_OUTOF10: 10

## 2019-05-21 NOTE — ED PROVIDER NOTES
I independently evaluated and obtained a history and physical on [address] [facility]. All diagnostic, treatment, and disposition assistants were made to myself in conjunction the advanced practice provider. For further details of this patient's emergency department encounter, please see the advanced practice provider's documentation. History: Pt fell onto right knee yesterday, has had pain since then. Had total reconstruction ~20y ago. Physician Exam: Mild swelling and pain with ROM, no gross deformity. MDM: Discussed medical decision making with MARY and agree with plan. Please see their note for further detail.          Rogelio Samuel MD  05/21/19 4146
injury: No  Bleeding diathesis or anticoagulant use: No    Based on the [place] HCT rules, head CT imaging is not indicated at this time. PHYSICAL EXAM  BP (!) 153/93   Pulse 58   Temp 98.9 °F (37.2 °C)   Resp 16   Ht 6' (1.829 m)   Wt 185 lb (83.9 kg)   SpO2 100%   BMI 25.09 kg/m²   GENERAL APPEARANCE: Awake and alert. Cooperative. No acute distress. HEAD: Normocephalic. Laceration and ecchymosis to forehead. No raccoon eyes. No peralta signs. EYES: PERRL. EOM's grossly intact. ENT: Mucous membranes are moist.   NECK: Supple. Normal ROM. CHEST: Equal symmetric chest rise. LUNGS: Breathing is unlabored. Speaking comfortably in full sentences. Abdomen: Nondistended  EXTREMITIES: MAEE. No acute deformities. Right knee no erythema, edema, ecchymosis. Patient is able to perform passive and active range of motion. Normal strength. Sensation intact. Distal pulses intact. Cap refill less than 2 seconds. Positive anterior drawer test. Negative valgus and varus stress. No bony tenderness. Neurovascularly intact. SKIN: Warm and dry. NEUROLOGICAL: Alert and oriented. Strength is 5/5 in all extremities and sensation is intact. RADIOLOGY  Xr Knee Right (3 Views)    Result Date: 5/21/2019  EXAMINATION: 3 XRAY VIEWS OF THE RIGHT KNEE 5/21/2019 12:52 pm COMPARISON: None. HISTORY: ORDERING SYSTEM PROVIDED HISTORY: pain swelling TECHNOLOGIST PROVIDED HISTORY: Reason for exam:->pain swelling Ordering Physician Provided Reason for Exam: pain, no trauma, prev injury Acuity: Acute Type of Exam: Initial Right knee pain. FINDINGS: Postsurgical changes of ACL repair are noted. There are mild tricompartmental degenerative changes manifested by loss of joint space and marginal osteophyte formation. There is no fracture, malalignment or osseous destruction. Note is made of mild prepatellar soft tissue swelling. No acute osseous abnormality.          ED COURSE   I have seen this patient in collaboration with

## 2019-05-21 NOTE — DISCHARGE INSTR - COC
Continuity of Care Form    Patient Name: Verna Hall   :  1970  MRN:  [id number]    Admit date:  2019  Discharge date:  ***    Code Status Order: Prior   Advance Directives:     Admitting Physician:  No admitting provider for patient encounter. PCP: No primary care provider on file.     Discharging Nurse: [facility] Unit/Room#:   Discharging Unit Phone Number: ***    Emergency Contact:   Extended Emergency Contact Information  Primary Emergency Contact: Yosi Pardo [address] Phone: [phone]  Relation: Other  Secondary Emergency Contact: RODDY MISTRY  Address: [address] [place] [address], [address] [place] [address],[address] Phone: [phone]  Relation: Grandparent    Past Surgical History:  Past Surgical History:   Procedure Laterality Date    APPENDECTOMY      HERNIA REPAIR      KNEE SURGERY      SHOULDER SURGERY         Immunization History:   Immunization History   Administered Date(s) Administered    Tdap (Boostrix, Adacel) 2019       Active Problems:  Patient Active Problem List   Diagnosis Code    Depressive disorder F32.9    Alcohol abuse M76.23    Alcoholic intoxication without complication ([facility] [address] F28.435    Suicidal behavior without attempted self-injury R46.89    Positive urine drug screen R82.5    Major depression, recurrent (HCC) F33.9       Isolation/Infection:   Isolation          No Isolation            Nurse Assessment:  Last Vital Signs: BP (!) 153/93   Pulse 58   Temp 98.9 °F (37.2 °C)   Resp 16   Ht 6' (1.829 m)   Wt 185 lb (83.9 kg)   SpO2 100%   BMI 25.09 kg/m²     Last documented pain score (0-10 scale): Pain Level: 10  Last Weight:   Wt Readings from Last 1 Encounters:   19 185 lb (83.9 kg)     Mental Status:  {IP PT MENTAL STATUS:68584}    IV Access:  [facility] IV ACCESS:302265689}    Nursing Mobility/ADLs:  Walking   {[facility] ARFS:059785815}  Transfer  {[facility] · Name:  · Address:  · Dialysis Schedule:  · Phone:  · Fax:    / signature: {Esignature:892569815}    PHYSICIAN SECTION    Prognosis: {Prognosis:2002747128}    Condition at Discharge: 50Chris Winkler Patient Condition:514427380}    Rehab Potential (if transferring to Rehab): {Prognosis:1115098784}    Recommended Labs or Other Treatments After Discharge: ***    Physician Certification: I certify the above information and transfer of Lesli Patrick  is necessary for the continuing treatment of the diagnosis listed and that he requires {Admit to Appropriate Level of Care:66066} for {GREATER/LESS:044549347} 30 days.      Update Admission H&P: {CHP DME Changes in [facility]:704195371}    PHYSICIAN SIGNATURE:  {Esignature:465639766}
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
